# Patient Record
Sex: FEMALE | Race: WHITE | NOT HISPANIC OR LATINO | Employment: OTHER | URBAN - METROPOLITAN AREA
[De-identification: names, ages, dates, MRNs, and addresses within clinical notes are randomized per-mention and may not be internally consistent; named-entity substitution may affect disease eponyms.]

---

## 2023-09-09 PROBLEM — E78.00 HYPERCHOLESTEROLEMIA: Status: ACTIVE | Noted: 2023-09-09

## 2023-09-09 PROBLEM — D17.9 LIPOMA: Status: ACTIVE | Noted: 2023-09-09

## 2023-09-09 PROBLEM — I10 HYPERTENSION: Status: ACTIVE | Noted: 2023-09-09

## 2023-09-09 PROBLEM — E03.9 HYPOTHYROIDISM: Status: ACTIVE | Noted: 2023-09-09

## 2023-09-09 PROBLEM — M81.0 OSTEOPOROSIS: Status: ACTIVE | Noted: 2023-09-09

## 2023-09-09 PROBLEM — F32.A DEPRESSION: Status: ACTIVE | Noted: 2023-09-09

## 2023-09-09 PROBLEM — R06.00 DYSPNEA: Status: ACTIVE | Noted: 2023-09-09

## 2023-09-09 PROBLEM — J18.9 ATYPICAL PNEUMONIA: Status: ACTIVE | Noted: 2023-09-09

## 2023-09-09 PROBLEM — R79.89 LOW TESTOSTERONE: Status: ACTIVE | Noted: 2023-09-09

## 2023-09-09 RX ORDER — LEVOTHYROXINE SODIUM 25 UG/1
25 TABLET ORAL DAILY
COMMUNITY
End: 2024-03-25 | Stop reason: ALTCHOICE

## 2023-09-09 RX ORDER — NAPROXEN SODIUM 220 MG/1
81 TABLET, FILM COATED ORAL DAILY
COMMUNITY
End: 2024-03-25 | Stop reason: ALTCHOICE

## 2023-09-09 RX ORDER — LEVOTHYROXINE SODIUM 50 UG/1
50 TABLET ORAL
COMMUNITY
Start: 2023-01-10 | End: 2024-02-08 | Stop reason: SDUPTHER

## 2023-09-09 RX ORDER — MULTIVITAMIN
1 TABLET ORAL DAILY
COMMUNITY

## 2023-09-09 RX ORDER — PROPRANOLOL HYDROCHLORIDE 20 MG/1
20 TABLET ORAL DAILY
COMMUNITY
Start: 2016-09-13 | End: 2024-03-25 | Stop reason: SDUPTHER

## 2023-09-09 RX ORDER — ASCORBIC ACID 500 MG
TABLET ORAL
COMMUNITY
End: 2024-03-25 | Stop reason: ALTCHOICE

## 2023-09-09 RX ORDER — VIT C/ZN GLUC/HERBAL NO.325 90 MG-15MG
LOZENGE MUCOUS MEMBRANE
COMMUNITY
Start: 2022-10-31 | End: 2024-03-25 | Stop reason: ALTCHOICE

## 2023-09-09 RX ORDER — ASCORBIC ACID 125 MG
1 TABLET,CHEWABLE ORAL DAILY
COMMUNITY

## 2024-02-07 DIAGNOSIS — E03.9 ACQUIRED HYPOTHYROIDISM: Primary | ICD-10-CM

## 2024-02-07 NOTE — TELEPHONE ENCOUNTER
REQUEST FOR LEVOTHYROXINE 50 MG PLEASE SEND TO [facility] ON [address] IN [place] 3/25/24   [facility] 3/20/23

## 2024-02-08 RX ORDER — LEVOTHYROXINE SODIUM 50 UG/1
50 TABLET ORAL
Qty: 90 TABLET | Refills: 0 | Status: SHIPPED | OUTPATIENT
Start: 2024-02-08 | End: 2024-03-25 | Stop reason: SDUPTHER

## 2024-03-25 ENCOUNTER — OFFICE VISIT (OUTPATIENT)
Dept: PRIMARY CARE | Facility: CLINIC | Age: 69
End: 2024-03-25
Payer: MEDICARE

## 2024-03-25 ENCOUNTER — LAB (OUTPATIENT)
Dept: LAB | Facility: LAB | Age: 69
End: 2024-03-25
Payer: MEDICARE

## 2024-03-25 VITALS
TEMPERATURE: 97.2 F | OXYGEN SATURATION: 99 % | DIASTOLIC BLOOD PRESSURE: 88 MMHG | HEIGHT: 68 IN | HEART RATE: 75 BPM | BODY MASS INDEX: 28.34 KG/M2 | WEIGHT: 187 LBS | SYSTOLIC BLOOD PRESSURE: 134 MMHG

## 2024-03-25 DIAGNOSIS — Z01.89 ENCOUNTER FOR ROUTINE LABORATORY TESTING: ICD-10-CM

## 2024-03-25 DIAGNOSIS — R73.9 HYPERGLYCEMIA: ICD-10-CM

## 2024-03-25 DIAGNOSIS — Z78.0 MENOPAUSE: ICD-10-CM

## 2024-03-25 DIAGNOSIS — I10 PRIMARY HYPERTENSION: ICD-10-CM

## 2024-03-25 DIAGNOSIS — M81.0 AGE-RELATED OSTEOPOROSIS WITHOUT CURRENT PATHOLOGICAL FRACTURE: ICD-10-CM

## 2024-03-25 DIAGNOSIS — E78.00 HYPERCHOLESTEROLEMIA: ICD-10-CM

## 2024-03-25 DIAGNOSIS — E03.9 ACQUIRED HYPOTHYROIDISM: ICD-10-CM

## 2024-03-25 DIAGNOSIS — Z00.00 ENCOUNTER FOR ANNUAL WELLNESS EXAM IN MEDICARE PATIENT: Primary | ICD-10-CM

## 2024-03-25 DIAGNOSIS — R79.89 LOW TESTOSTERONE LEVEL IN FEMALE: ICD-10-CM

## 2024-03-25 DIAGNOSIS — Z12.31 ENCOUNTER FOR SCREENING MAMMOGRAM FOR BREAST CANCER: ICD-10-CM

## 2024-03-25 LAB
ALBUMIN SERPL-MCNC: 4.5 G/DL (ref 3.5–5)
ALP BLD-CCNC: 83 U/L (ref 35–125)
ALT SERPL-CCNC: 17 U/L (ref 5–40)
ANION GAP SERPL CALC-SCNC: 13 MMOL/L
AST SERPL-CCNC: 18 U/L (ref 5–40)
BASOPHILS # BLD AUTO: 0.04 X10*3/UL (ref 0–0.1)
BASOPHILS NFR BLD AUTO: 1 %
BILIRUB SERPL-MCNC: 0.6 MG/DL (ref 0.1–1.2)
BUN SERPL-MCNC: 13 MG/DL (ref 8–25)
CALCIUM SERPL-MCNC: 9.4 MG/DL (ref 8.5–10.4)
CHLORIDE SERPL-SCNC: 104 MMOL/L (ref 97–107)
CHOLEST SERPL-MCNC: 199 MG/DL (ref 133–200)
CHOLEST/HDLC SERPL: 2.9 {RATIO}
CO2 SERPL-SCNC: 24 MMOL/L (ref 24–31)
CREAT SERPL-MCNC: 0.9 MG/DL (ref 0.4–1.6)
EGFRCR SERPLBLD CKD-EPI 2021: 70 ML/MIN/1.73M*2
EOSINOPHIL # BLD AUTO: 0.08 X10*3/UL (ref 0–0.7)
EOSINOPHIL NFR BLD AUTO: 1.9 %
ERYTHROCYTE [DISTWIDTH] IN BLOOD BY AUTOMATED COUNT: 12.2 % (ref 11.5–14.5)
EST. AVERAGE GLUCOSE BLD GHB EST-MCNC: 105 MG/DL
GLUCOSE SERPL-MCNC: 96 MG/DL (ref 65–99)
HBA1C MFR BLD: 5.3 %
HCT VFR BLD AUTO: 39.9 % (ref 36–46)
HDLC SERPL-MCNC: 68 MG/DL
HGB BLD-MCNC: 13.2 G/DL (ref 12–16)
IMM GRANULOCYTES # BLD AUTO: 0 X10*3/UL (ref 0–0.7)
IMM GRANULOCYTES NFR BLD AUTO: 0 % (ref 0–0.9)
LDLC SERPL CALC-MCNC: 116 MG/DL (ref 65–130)
LYMPHOCYTES # BLD AUTO: 1.54 X10*3/UL (ref 1.2–4.8)
LYMPHOCYTES NFR BLD AUTO: 36.8 %
MCH RBC QN AUTO: 30.5 PG (ref 26–34)
MCHC RBC AUTO-ENTMCNC: 33.1 G/DL (ref 32–36)
MCV RBC AUTO: 92 FL (ref 80–100)
MONOCYTES # BLD AUTO: 0.39 X10*3/UL (ref 0.1–1)
MONOCYTES NFR BLD AUTO: 9.3 %
NEUTROPHILS # BLD AUTO: 2.13 X10*3/UL (ref 1.2–7.7)
NEUTROPHILS NFR BLD AUTO: 51 %
NRBC BLD-RTO: 0 /100 WBCS (ref 0–0)
PLATELET # BLD AUTO: 207 X10*3/UL (ref 150–450)
POTASSIUM SERPL-SCNC: 4.1 MMOL/L (ref 3.4–5.1)
PROT SERPL-MCNC: 6.9 G/DL (ref 5.9–7.9)
RBC # BLD AUTO: 4.33 X10*6/UL (ref 4–5.2)
SODIUM SERPL-SCNC: 141 MMOL/L (ref 133–145)
TRIGL SERPL-MCNC: 75 MG/DL (ref 40–150)
TSH SERPL DL<=0.05 MIU/L-ACNC: 1.6 MIU/L (ref 0.27–4.2)
WBC # BLD AUTO: 4.2 X10*3/UL (ref 4.4–11.3)

## 2024-03-25 PROCEDURE — G0439 PPPS, SUBSEQ VISIT: HCPCS | Performed by: NURSE PRACTITIONER

## 2024-03-25 PROCEDURE — 80061 LIPID PANEL: CPT

## 2024-03-25 PROCEDURE — 1159F MED LIST DOCD IN RCRD: CPT | Performed by: NURSE PRACTITIONER

## 2024-03-25 PROCEDURE — 1126F AMNT PAIN NOTED NONE PRSNT: CPT | Performed by: NURSE PRACTITIONER

## 2024-03-25 PROCEDURE — 99215 OFFICE O/P EST HI 40 MIN: CPT | Performed by: NURSE PRACTITIONER

## 2024-03-25 PROCEDURE — 85025 COMPLETE CBC W/AUTO DIFF WBC: CPT

## 2024-03-25 PROCEDURE — 36415 COLL VENOUS BLD VENIPUNCTURE: CPT

## 2024-03-25 PROCEDURE — 3075F SYST BP GE 130 - 139MM HG: CPT | Performed by: NURSE PRACTITIONER

## 2024-03-25 PROCEDURE — 84402 ASSAY OF FREE TESTOSTERONE: CPT

## 2024-03-25 PROCEDURE — 80053 COMPREHEN METABOLIC PANEL: CPT

## 2024-03-25 PROCEDURE — 84443 ASSAY THYROID STIM HORMONE: CPT

## 2024-03-25 PROCEDURE — 83036 HEMOGLOBIN GLYCOSYLATED A1C: CPT

## 2024-03-25 PROCEDURE — 1036F TOBACCO NON-USER: CPT | Performed by: NURSE PRACTITIONER

## 2024-03-25 PROCEDURE — 3079F DIAST BP 80-89 MM HG: CPT | Performed by: NURSE PRACTITIONER

## 2024-03-25 RX ORDER — LEVOTHYROXINE SODIUM 50 UG/1
50 TABLET ORAL
Qty: 90 TABLET | Refills: 0 | Status: SHIPPED | OUTPATIENT
Start: 2024-03-25

## 2024-03-25 RX ORDER — PROPRANOLOL HYDROCHLORIDE 20 MG/1
20 TABLET ORAL DAILY
Qty: 90 TABLET | Refills: 3 | Status: SHIPPED | OUTPATIENT
Start: 2024-03-25

## 2024-03-25 ASSESSMENT — ENCOUNTER SYMPTOMS
BLOOD IN STOOL: 0
VOMITING: 0
CONFUSION: 0
DIAPHORESIS: 0
FLANK PAIN: 0
LOSS OF SENSATION IN FEET: 0
HEADACHES: 0
FEVER: 0
WOUND: 0
FACIAL ASYMMETRY: 0
SHORTNESS OF BREATH: 0
PALPITATIONS: 0
CHILLS: 0
DEPRESSION: 0
COUGH: 0
FATIGUE: 0
BRUISES/BLEEDS EASILY: 0
SPEECH DIFFICULTY: 0
HEMATURIA: 0
NAUSEA: 0
AGITATION: 0
DIZZINESS: 0
POLYDIPSIA: 0
ADENOPATHY: 0
SEIZURES: 0
POLYPHAGIA: 0
OCCASIONAL FEELINGS OF UNSTEADINESS: 0
ABDOMINAL PAIN: 0
BACK PAIN: 0
DYSURIA: 0
CHEST TIGHTNESS: 0
NECK PAIN: 0

## 2024-03-25 ASSESSMENT — LIFESTYLE VARIABLES
HOW OFTEN DURING THE LAST YEAR HAVE YOU FOUND THAT YOU WERE NOT ABLE TO STOP DRINKING ONCE YOU HAD STARTED: NEVER
HOW OFTEN DURING THE LAST YEAR HAVE YOU BEEN UNABLE TO REMEMBER WHAT HAPPENED THE NIGHT BEFORE BECAUSE YOU HAD BEEN DRINKING: NEVER
AUDIT-C TOTAL SCORE: 0
SKIP TO QUESTIONS 9-10: 1
AUDIT TOTAL SCORE: 0
HOW OFTEN DURING THE LAST YEAR HAVE YOU NEEDED AN ALCOHOLIC DRINK FIRST THING IN THE MORNING TO GET YOURSELF GOING AFTER A NIGHT OF HEAVY DRINKING: NEVER
HOW OFTEN DURING THE LAST YEAR HAVE YOU FAILED TO DO WHAT WAS NORMALLY EXPECTED FROM YOU BECAUSE OF DRINKING: NEVER
HAVE YOU OR SOMEONE ELSE BEEN INJURED AS A RESULT OF YOUR DRINKING: NO
HOW OFTEN DO YOU HAVE A DRINK CONTAINING ALCOHOL: NEVER
HOW MANY STANDARD DRINKS CONTAINING ALCOHOL DO YOU HAVE ON A TYPICAL DAY: PATIENT DOES NOT DRINK
HOW OFTEN DURING THE LAST YEAR HAVE YOU HAD A FEELING OF GUILT OR REMORSE AFTER DRINKING: NEVER
HAS A RELATIVE, FRIEND, DOCTOR, OR ANOTHER HEALTH PROFESSIONAL EXPRESSED CONCERN ABOUT YOUR DRINKING OR SUGGESTED YOU CUT DOWN: NO
HOW OFTEN DO YOU HAVE SIX OR MORE DRINKS ON ONE OCCASION: NEVER

## 2024-03-25 ASSESSMENT — PATIENT HEALTH QUESTIONNAIRE - PHQ9
SUM OF ALL RESPONSES TO PHQ9 QUESTIONS 1 AND 2: 0
2. FEELING DOWN, DEPRESSED OR HOPELESS: NOT AT ALL
1. LITTLE INTEREST OR PLEASURE IN DOING THINGS: NOT AT ALL

## 2024-03-25 ASSESSMENT — PAIN SCALES - GENERAL: PAINLEVEL: 0-NO PAIN

## 2024-03-25 NOTE — PROGRESS NOTES
[facility]: [facility]  MEDICARE WELLNESS EXAM      Elma Cox is a 68 y.o. female that is presenting today for CPE.    Patient requesting to have testosterone checked due to h/o low testosterone. We ordered this last year and the lab did not complete it.    Assessment/Plan    Diagnoses and all orders for this visit:    Encounter for annual wellness exam in Medicare patient    Primary hypertension  -     Good Control  -     CBC and Auto Differential; Future  -     Comprehensive Metabolic Panel; Future  -     Lipid Panel; Future  -     Continue propranolol (Inderal) 20 mg tablet; Take 1 tablet (20 mg) by mouth once daily.    Hypercholesterolemia  -     Comprehensive Metabolic Panel; Future  -     Lipid Panel; Future    Acquired hypothyroidism  -     Clinically euthyroid  -     TSH with reflex to Free T4 if abnormal; Future  -     Continue levothyroxine (Synthroid, Levoxyl) 50 mcg tablet; Take 1 tablet (50 mcg) by mouth once daily in the morning. Take before meals.    Age-related osteoporosis without current pathological fracture  -     XR DEXA bone density; Future    Hyperglycemia  -     Comprehensive Metabolic Panel; Future  -     Hemoglobin A1C; Future    Encounter for routine laboratory testing  -     CBC and Auto Differential; Future  -     Comprehensive Metabolic Panel; Future  -     Lipid Panel; Future  -     Hemoglobin A1C; Future  -     TSH with reflex to Free T4 if abnormal; Future    Menopause  -     XR DEXA bone density; Future    Encounter for screening mammogram for breast cancer  -     BI mammo bilateral screening tomosynthesis; Future    Low testosterone level in female  -     Testosterone, total and free; Future    Other orders  -     Follow Up In Primary Care - Medicare Annual; Future    ADVANCED CARE PLANNING  Advanced Care Planning was discussed with patient:  The patient does not have an advanced care plan on file. The patient does not have an active surrogate  decision-maker on file.  Encouraged the patient to confirm that Living Will and Healthcare Power of  (HCPoA) are accurate and up to date.  Encouraged the patient to confirm that our office be provided a copy of any documentation in the event that anything changes.    ACTIVITIES OF DAILY LIVING  Basic ADLs:  Bathing: Independent, Dressing: Independent, Toileting: Independent, Transferring: Independent, Continence: Independent, Feeding: Independent.    Instrumental ADLs:  Ability to use phone: Independent, Shopping: Independent, Cooking: Independent, House-keeping: Independent, Laundry: Independent, Transportation: Independent, Medication Management: Independent, Finance Management: Independent.    Subjective   Lane Cox is here for follow-up of her hypertension.  Home blood pressure readings: not doing. Salt intake and diet: salt not added to cooking. Associated signs and symptoms: none. Patient denies: blurred vision, chest pain, dyspnea, headache, orthopnea, palpitations, and peripheral edema. Use of agents associated with hypertension: thyroid hormones. Medication compliance: taking as prescribed.    Objective  [unfilled]    Lab Review   not applicable    Assessment/Plan  Hypertension, normal blood pressure    Medication: no change.  Dietary sodium restriction.  Regular aerobic exercise.  Check blood pressures weekly and record.  Follow up: 1 year and as needed.    Lane Cox is a 68 y.o. female who presents for follow up of hypothyroidism. Current symptoms: weight changes. Patient denies change in energy level, diarrhea, heat / cold intolerance, nervousness, and palpitations. Symptoms have been well-controlled.    [unfilled]     Objective  [unfilled]    Laboratory:  Lab Results       Component                Value               Date                       TSH                      1.79                03/20/2023              Assessment/Plan  Hypothyroidism.  Replacement is  Levothyroxine 50 mcg daily    1. L-thyroxine per orders.  2. Recheck thyroid function tests today  3. Instructed not to take multivitamins or iron within 4 hours of taking thyroid medications.  4. Follow up in 1 year.            Review of Systems   Constitutional:  Negative for chills, diaphoresis, fatigue and fever.   HENT:  Negative for hearing loss and mouth sores.    Eyes:  Negative for visual disturbance.   Respiratory:  Negative for cough, chest tightness and shortness of breath.    Cardiovascular:  Negative for chest pain, palpitations and leg swelling.   Gastrointestinal:  Negative for abdominal pain, blood in stool, nausea and vomiting.   Endocrine: Negative for cold intolerance, heat intolerance, polydipsia, polyphagia and polyuria.   Genitourinary:  Negative for dysuria, flank pain and hematuria.   Musculoskeletal:  Negative for back pain and neck pain.   Skin:  Negative for rash and wound.   Allergic/Immunologic: Negative for environmental allergies, food allergies and immunocompromised state.   Neurological:  Negative for dizziness, seizures, syncope, facial asymmetry, speech difficulty and headaches.   Hematological:  Negative for adenopathy. Does not bruise/bleed easily.   Psychiatric/Behavioral:  Negative for agitation and confusion.      Objective   Vitals:    03/25/24 0932   BP: 134/88   Pulse: 75   Temp: 36.2 °C (97.2 °F)   SpO2: 99%      Body mass index is 28.86 kg/m².  Physical Exam  Vitals and nursing note reviewed.   Constitutional:       General: She is not in acute distress.     Appearance: Normal appearance. She is not ill-appearing.   HENT:      Head: Normocephalic and atraumatic.      Right Ear: Tympanic membrane, ear canal and external ear normal. There is no impacted cerumen.      Left Ear: Tympanic membrane, ear canal and external ear normal. There is no impacted cerumen.      Nose: Nose normal.      Mouth/Throat:      Mouth: Mucous membranes are moist.      Pharynx: Oropharynx is  clear. No oropharyngeal exudate or posterior oropharyngeal erythema.   Eyes:      General: No scleral icterus.        Right eye: No discharge.         Left eye: No discharge.      Extraocular Movements: Extraocular movements intact.      Conjunctiva/sclera: Conjunctivae normal.      Pupils: Pupils are equal, round, and reactive to light.   Neck:      Vascular: No carotid bruit.   Cardiovascular:      Rate and Rhythm: Normal rate and regular rhythm.      Pulses: Normal pulses.      Heart sounds: Normal heart sounds. No murmur heard.  Pulmonary:      Effort: Pulmonary effort is normal. No respiratory distress.      Breath sounds: Normal breath sounds.   Abdominal:      General: Abdomen is flat. Bowel sounds are normal. There is no distension.      Palpations: Abdomen is soft. There is no mass.      Tenderness: There is no abdominal tenderness. There is no right CVA tenderness or left CVA tenderness.      Hernia: No hernia is present.   Musculoskeletal:         General: No tenderness. Normal range of motion.      Cervical back: No tenderness.      Right lower leg: No edema.      Left lower leg: No edema.   Lymphadenopathy:      Cervical: No cervical adenopathy.   Skin:     General: Skin is warm and dry.      Coloration: Skin is not jaundiced.      Findings: No rash.   Neurological:      General: No focal deficit present.      Mental Status: She is alert and oriented to person, place, and time. Mental status is at baseline.   Psychiatric:         Mood and Affect: Mood normal.         Behavior: Behavior normal.       Diagnostic Results   Lab Results   Component Value Date    GLUCOSE 96 03/20/2023    CALCIUM 9.4 03/20/2023     03/20/2023    K 4.6 03/20/2023    CO2 25 03/20/2023     03/20/2023    BUN 16 03/20/2023    CREATININE 0.9 03/20/2023     Lab Results   Component Value Date    ALT 17 03/20/2023    AST 19 03/20/2023    ALKPHOS 82 03/20/2023    BILITOT 0.7 03/20/2023     Lab Results   Component Value Date  "   WBC 5.4 03/20/2023    HGB 14.0 03/20/2023    HCT 42.0 03/20/2023    MCV 93.1 03/20/2023     03/20/2023     Lab Results   Component Value Date    CHOL 209 (H) 03/20/2023    CHOL 219 (H) 02/21/2022    CHOL 202 (H) 01/11/2021     Lab Results   Component Value Date    HDL 66 03/20/2023    HDL 80 02/21/2022    HDL 77 01/11/2021     Lab Results   Component Value Date    LDLCALC 122 03/20/2023    LDLCALC 123 02/21/2022    LDLCALC 105 01/11/2021     Lab Results   Component Value Date    TRIG 105 03/20/2023    TRIG 81 02/21/2022    TRIG 99 01/11/2021     No components found for: \"CHOLHDL\"  No results found for: \"HGBA1C\"  Other labs not included in the list above reviewed either before or during this encounter.    History   History reviewed. No pertinent past medical history.  History reviewed. No pertinent surgical history.  Family History   Problem Relation Name Age of Onset    Hypertension Mother      Heart disease Maternal Grandmother      Stroke Paternal Grandmother      Stroke Paternal Grandfather       Social History     Socioeconomic History    Marital status:      Spouse name: Not on file    Number of children: Not on file    Years of education: Not on file    Highest education level: Not on file   Occupational History    Not on file   Tobacco Use    Smoking status: Never     Passive exposure: Never    Smokeless tobacco: Never   Substance and Sexual Activity    Alcohol use: Never    Drug use: Never    Sexual activity: Not on file   Other Topics Concern    Not on file   Social History Narrative    Not on file     Social Determinants of Health     Financial Resource Strain: Not on file   Food Insecurity: Not on file   Transportation Needs: Not on file   Physical Activity: Not on file   Stress: Not on file   Social Connections: Not on file   Intimate Partner Violence: Not on file   Housing Stability: Not on file     Allergies   Allergen Reactions    Epinephrine Other     Rapid heartbeat    Metoprolol " Succinate Other     Rapid heart rate. Only generic causes this. Pt is fine on toprol xl.     Current Outpatient Medications on File Prior to Visit   Medication Sig Dispense Refill    biotin 5,000 mcg tablet,chewable Chew 1 capsule once daily.      L. acidophilus/Bifid. animalis (DAILY PROBIOTIC ORAL) as directed Orally      MAGNESIUM GLYCINATE ORAL Take by mouth. 1 TAB DAILY      multivitamin tablet Take 1 tablet by mouth once daily.      NON FORMULARY Bone Up ...   3 pill orally Daily      NON FORMULARY Ortho Thyroid -   take 2 capsules orally daily      TURMERIC ORAL Take by mouth. WITH CURCUMIN  2 TABS (GUMMIES) DAILY      [DISCONTINUED] ascorbic acid (Vitamin C) 500 mg tablet as directed Orally      [DISCONTINUED] aspirin 81 mg chewable tablet Chew 1 tablet (81 mg) once daily.      [DISCONTINUED] levothyroxine (Synthroid, Levoxyl) 50 mcg tablet Take 1 tablet (50 mcg) by mouth once daily in the morning. Take before meals. 90 tablet 0    [DISCONTINUED] propranolol (Inderal) 20 mg tablet Take 1 tablet (20 mg) by mouth once daily.      [DISCONTINUED] vit C-Zn gluc-herbal no.325 (Elderberry Zinc Vit C) 90-15 mg lozenge ZINC LOZENGES 90 LONZENGES ...   1 pill orally Daily      [DISCONTINUED] levothyroxine (Synthroid, Levoxyl) 25 mcg tablet Take 1 tablet (25 mcg) by mouth once daily.       No current facility-administered medications on file prior to visit.     Immunization History   Administered Date(s) Administered    Flu vaccine, quadrivalent, high-dose, preservative free, age 65y+ (FLUZONE) 10/20/2022    Influenza, injectable, quadrivalent 11/10/2015, 10/24/2018, 12/04/2019    Influenza, seasonal, injectable 08/24/2015    Pfizer Purple Cap SARS-CoV-2 04/10/2021, 05/01/2021    Pneumococcal conjugate vaccine, 13-valent (PREVNAR 13) 02/21/2022    Pneumococcal polysaccharide vaccine, 23-valent, age 2 years and older (PNEUMOVAX 23) 03/20/2023    Tetanus toxoid, adsorbed 08/01/2013     Patient's medical history was  reviewed and updated either before or during this encounter.     Iris Roldan, APRN-CNP

## 2024-03-26 NOTE — RESULT ENCOUNTER NOTE
Left voice mail for patient, pt informed of lab results and advised to call the office if she has any questions.

## 2024-03-29 LAB
TESTOSTERONE FREE (CHAN): 1.4 PG/ML (ref 0.1–6.4)
TESTOSTERONE,TOTAL,LC-MS/MS: 11 NG/DL (ref 2–45)

## 2024-08-21 DIAGNOSIS — E03.9 ACQUIRED HYPOTHYROIDISM: ICD-10-CM

## 2024-08-21 RX ORDER — LEVOTHYROXINE SODIUM 50 UG/1
50 TABLET ORAL
Qty: 90 TABLET | Refills: 2 | Status: SHIPPED | OUTPATIENT
Start: 2024-08-21

## 2025-03-20 DIAGNOSIS — I10 PRIMARY HYPERTENSION: ICD-10-CM

## 2025-03-20 RX ORDER — PROPRANOLOL HYDROCHLORIDE 20 MG/1
20 TABLET ORAL DAILY
Qty: 90 TABLET | Refills: 3 | Status: SHIPPED | OUTPATIENT
Start: 2025-03-20

## 2025-03-31 ENCOUNTER — OFFICE VISIT (OUTPATIENT)
Dept: PRIMARY CARE | Facility: CLINIC | Age: 70
End: 2025-03-31
Payer: MEDICARE

## 2025-03-31 VITALS
SYSTOLIC BLOOD PRESSURE: 138 MMHG | WEIGHT: 180 LBS | TEMPERATURE: 96.6 F | BODY MASS INDEX: 27.28 KG/M2 | HEIGHT: 68 IN | OXYGEN SATURATION: 99 % | DIASTOLIC BLOOD PRESSURE: 80 MMHG | HEART RATE: 74 BPM

## 2025-03-31 DIAGNOSIS — E03.9 ACQUIRED HYPOTHYROIDISM: ICD-10-CM

## 2025-03-31 DIAGNOSIS — Z00.00 ENCOUNTER FOR ANNUAL WELLNESS EXAM IN MEDICARE PATIENT: Primary | ICD-10-CM

## 2025-03-31 DIAGNOSIS — Z13.820 ENCOUNTER FOR SCREENING FOR OSTEOPOROSIS: ICD-10-CM

## 2025-03-31 DIAGNOSIS — Z12.31 ENCOUNTER FOR SCREENING MAMMOGRAM FOR BREAST CANCER: ICD-10-CM

## 2025-03-31 DIAGNOSIS — E78.00 HYPERCHOLESTEROLEMIA: ICD-10-CM

## 2025-03-31 DIAGNOSIS — I10 PRIMARY HYPERTENSION: ICD-10-CM

## 2025-03-31 DIAGNOSIS — Z78.0 MENOPAUSE: ICD-10-CM

## 2025-03-31 PROCEDURE — 1159F MED LIST DOCD IN RCRD: CPT | Performed by: NURSE PRACTITIONER

## 2025-03-31 PROCEDURE — 1124F ACP DISCUSS-NO DSCNMKR DOCD: CPT | Performed by: NURSE PRACTITIONER

## 2025-03-31 PROCEDURE — 3079F DIAST BP 80-89 MM HG: CPT | Performed by: NURSE PRACTITIONER

## 2025-03-31 PROCEDURE — 1126F AMNT PAIN NOTED NONE PRSNT: CPT | Performed by: NURSE PRACTITIONER

## 2025-03-31 PROCEDURE — 1036F TOBACCO NON-USER: CPT | Performed by: NURSE PRACTITIONER

## 2025-03-31 PROCEDURE — 1160F RVW MEDS BY RX/DR IN RCRD: CPT | Performed by: NURSE PRACTITIONER

## 2025-03-31 PROCEDURE — G0439 PPPS, SUBSEQ VISIT: HCPCS | Performed by: NURSE PRACTITIONER

## 2025-03-31 PROCEDURE — 3075F SYST BP GE 130 - 139MM HG: CPT | Performed by: NURSE PRACTITIONER

## 2025-03-31 PROCEDURE — 3008F BODY MASS INDEX DOCD: CPT | Performed by: NURSE PRACTITIONER

## 2025-03-31 PROCEDURE — 99215 OFFICE O/P EST HI 40 MIN: CPT | Performed by: NURSE PRACTITIONER

## 2025-03-31 RX ORDER — ZINC GLUCONATE 50 MG
50 TABLET ORAL DAILY
COMMUNITY
End: 2025-03-31 | Stop reason: WASHOUT

## 2025-03-31 RX ORDER — ALBUTEROL SULFATE 90 UG/1
2 INHALANT RESPIRATORY (INHALATION) EVERY 6 HOURS PRN
COMMUNITY
End: 2025-03-31 | Stop reason: WASHOUT

## 2025-03-31 RX ORDER — LEVOTHYROXINE SODIUM 50 UG/1
50 TABLET ORAL
Qty: 90 TABLET | Refills: 3 | Status: SHIPPED | OUTPATIENT
Start: 2025-03-31

## 2025-03-31 RX ORDER — PROPRANOLOL HYDROCHLORIDE 20 MG/1
20 TABLET ORAL DAILY
Qty: 90 TABLET | Refills: 3 | Status: SHIPPED | OUTPATIENT
Start: 2025-03-31

## 2025-03-31 ASSESSMENT — ENCOUNTER SYMPTOMS
COUGH: 0
CHEST TIGHTNESS: 0
NAUSEA: 0
HEMATURIA: 0
POLYPHAGIA: 0
ADENOPATHY: 0
BACK PAIN: 0
FATIGUE: 0
CONFUSION: 0
DIZZINESS: 0
PALPITATIONS: 0
NECK PAIN: 0
SEIZURES: 0
FACIAL ASYMMETRY: 0
WOUND: 0
AGITATION: 0
CHILLS: 0
DIAPHORESIS: 0
SHORTNESS OF BREATH: 0
VOMITING: 0
FEVER: 0
HEADACHES: 0
ABDOMINAL PAIN: 0
DYSURIA: 0
BLOOD IN STOOL: 0
BRUISES/BLEEDS EASILY: 0
FLANK PAIN: 0
POLYDIPSIA: 0
SPEECH DIFFICULTY: 0

## 2025-03-31 ASSESSMENT — PAIN SCALES - GENERAL: PAINLEVEL_OUTOF10: 0-NO PAIN

## 2025-03-31 ASSESSMENT — PATIENT HEALTH QUESTIONNAIRE - PHQ9
2. FEELING DOWN, DEPRESSED OR HOPELESS: NOT AT ALL
1. LITTLE INTEREST OR PLEASURE IN DOING THINGS: NOT AT ALL
SUM OF ALL RESPONSES TO PHQ9 QUESTIONS 1 AND 2: 0

## 2025-03-31 NOTE — PROGRESS NOTES
[facility]: [facility]  MEDICARE WELLNESS EXAM      Elma Cox is a 69 y.o. female that is presenting today for Medicare Annual Wellness Exam.    Encouraged Covid 19, Tdap, Influenza and Shingrix immunizations via local pharmacy.    Ms. Cox reports taking antihypertensive as directed. She is trying to follow a low sodium diet. She is not monitoring home BP. Denies CP, SOB, dizziness, syncope or HA.    She reports taking levothyroxine as directed without food. Denies heat or cold intolerance, unintentional weight changes, palpitations.    Assessment/Plan      Diagnoses and all orders for this visit:    Encounter for annual wellness exam in Medicare patient        -     Routine and preventative care provided and discussed with patient.    Primary hypertension  -     Acceptable control  -     CBC and Auto Differential; Future  -     Comprehensive Metabolic Panel; Future  -     Lipid Panel; Future  -     propranolol (Inderal) 20 mg tablet; Take 1 tablet (20 mg) by mouth once daily.    Hypercholesterolemia  -     Comprehensive Metabolic Panel; Future  -     Lipid Panel; Future    Acquired hypothyroidism  -     Clinically euthyroid  -     TSH with reflex to Free T4 if abnormal; Future  -     levothyroxine (Synthroid, Levoxyl) 50 mcg tablet; Take 1 tablet (50 mcg) by mouth once daily in the morning. Take before meals. TAKE BEFORE MEALS    Encounter for screening mammogram for breast cancer  -     BI mammo bilateral screening tomosynthesis; Future    Encounter for screening for osteoporosis  -     XR DEXA bone density; Future    Menopause  -     XR DEXA bone density; Future    Other orders  -     Follow Up In Primary Care - Medicare Annual  -     Follow Up In Primary Care - Medicare Annual; Future    ADVANCED CARE PLANNING  Advanced Care Planning was discussed with patient:  The patient does not have an advanced care plan on file. The patient does not have an active surrogate decision-maker on  file.  Encouraged the patient to confirm that Living Will and Healthcare Power of  (HCPoA) are accurate and up to date.  Encouraged the patient to confirm that our office be provided a copy of any documentation in the event that anything changes.    ACTIVITIES OF DAILY LIVING  Basic ADLs:  Bathing: Independent, Dressing: Independent, Toileting: Independent, Transferring: Independent, Continence: Independent, Feeding: Independent.    Instrumental ADLs:  Ability to use phone: Independent, Shopping: Independent, Cooking: Independent, House-keeping: Independent, Laundry: Independent, Transportation: Independent, Medication Management: Independent, Finance Management: Independent.    Subjective   HPI  Review of Systems   Constitutional:  Negative for chills, diaphoresis, fatigue and fever.   HENT:  Negative for hearing loss and mouth sores.    Eyes:  Negative for visual disturbance.   Respiratory:  Negative for cough, chest tightness and shortness of breath.    Cardiovascular:  Negative for chest pain, palpitations and leg swelling.   Gastrointestinal:  Negative for abdominal pain, blood in stool, nausea and vomiting.   Endocrine: Negative for cold intolerance, heat intolerance, polydipsia, polyphagia and polyuria.   Genitourinary:  Negative for dysuria, flank pain and hematuria.   Musculoskeletal:  Negative for back pain and neck pain.   Skin:  Negative for rash and wound.   Allergic/Immunologic: Negative for environmental allergies, food allergies and immunocompromised state.   Neurological:  Negative for dizziness, seizures, syncope, facial asymmetry, speech difficulty and headaches.   Hematological:  Negative for adenopathy. Does not bruise/bleed easily.   Psychiatric/Behavioral:  Negative for agitation and confusion.      Objective   Vitals:    03/31/25 0853   BP: 138/80   Pulse: 74   Temp: 35.9 °C (96.6 °F)   SpO2: 99%      Body mass index is 27.78 kg/m².  Physical Exam  Vitals and nursing note reviewed.    Constitutional:       General: She is not in acute distress.     Appearance: Normal appearance. She is not ill-appearing.   HENT:      Head: Normocephalic and atraumatic.      Right Ear: Tympanic membrane, ear canal and external ear normal. There is no impacted cerumen.      Left Ear: Tympanic membrane, ear canal and external ear normal. There is no impacted cerumen.      Nose: Nose normal.      Mouth/Throat:      Mouth: Mucous membranes are moist.      Pharynx: Oropharynx is clear. No oropharyngeal exudate or posterior oropharyngeal erythema.   Eyes:      General: No scleral icterus.        Right eye: No discharge.         Left eye: No discharge.      Extraocular Movements: Extraocular movements intact.      Conjunctiva/sclera: Conjunctivae normal.      Pupils: Pupils are equal, round, and reactive to light.   Neck:      Vascular: No carotid bruit.   Cardiovascular:      Rate and Rhythm: Normal rate and regular rhythm.      Pulses: Normal pulses.      Heart sounds: Normal heart sounds. No murmur heard.  Pulmonary:      Effort: Pulmonary effort is normal. No respiratory distress.      Breath sounds: Normal breath sounds.   Abdominal:      General: Abdomen is flat. Bowel sounds are normal. There is no distension.      Palpations: Abdomen is soft. There is no mass.      Tenderness: There is no abdominal tenderness. There is no right CVA tenderness or left CVA tenderness.   Musculoskeletal:         General: No tenderness. Normal range of motion.      Cervical back: No tenderness.      Right lower leg: No edema.      Left lower leg: No edema.   Lymphadenopathy:      Cervical: No cervical adenopathy.   Skin:     General: Skin is warm and dry.      Coloration: Skin is not jaundiced.      Findings: No rash.   Neurological:      General: No focal deficit present.      Mental Status: She is alert and oriented to person, place, and time. Mental status is at baseline.   Psychiatric:         Mood and Affect: Mood normal.     "     Behavior: Behavior normal.       Diagnostic Results   Lab Results   Component Value Date    GLUCOSE 96 03/25/2024    CALCIUM 9.4 03/25/2024     03/25/2024    K 4.1 03/25/2024    CO2 24 03/25/2024     03/25/2024    BUN 13 03/25/2024    CREATININE 0.90 03/25/2024     Lab Results   Component Value Date    ALT 17 03/25/2024    AST 18 03/25/2024    ALKPHOS 83 03/25/2024    BILITOT 0.6 03/25/2024     Lab Results   Component Value Date    WBC 4.2 (L) 03/25/2024    HGB 13.2 03/25/2024    HCT 39.9 03/25/2024    MCV 92 03/25/2024     03/25/2024     Lab Results   Component Value Date    CHOL 199 03/25/2024    CHOL 209 (H) 03/20/2023    CHOL 219 (H) 02/21/2022     Lab Results   Component Value Date    HDL 68.0 03/25/2024    HDL 66 03/20/2023    HDL 80 02/21/2022     Lab Results   Component Value Date    LDLCALC 116 03/25/2024    LDLCALC 122 03/20/2023    LDLCALC 123 02/21/2022     Lab Results   Component Value Date    TRIG 75 03/25/2024    TRIG 105 03/20/2023    TRIG 81 02/21/2022     No components found for: \"CHOLHDL\"  Lab Results   Component Value Date    HGBA1C 5.3 03/25/2024     Other labs not included in the list above reviewed either before or during this encounter.    History   History reviewed. No pertinent past medical history.  History reviewed. No pertinent surgical history.  Family History   Problem Relation Name Age of Onset    Hypertension Mother      Heart disease Maternal Grandmother      Stroke Paternal Grandmother      Stroke Paternal Grandfather       Social History     Socioeconomic History    Marital status:      Spouse name: Not on file    Number of children: Not on file    Years of education: Not on file    Highest education level: Not on file   Occupational History    Not on file   Tobacco Use    Smoking status: Never     Passive exposure: Never    Smokeless tobacco: Never   Substance and Sexual Activity    Alcohol use: Never    Drug use: Never    Sexual activity: Not on " file   Other Topics Concern    Not on file   Social History Narrative    Not on file     Social Drivers of Health     Financial Resource Strain: Not on file   Food Insecurity: Not on file   Transportation Needs: Not on file   Physical Activity: Not on file   Stress: Not on file   Social Connections: Not on file   Intimate Partner Violence: Not on file   Housing Stability: Not on file     Allergies   Allergen Reactions    Epinephrine Other     Rapid heartbeat    Metoprolol Succinate Other     Rapid heart rate. Only generic causes this. Pt is fine on toprol xl.     Current Outpatient Medications on File Prior to Visit   Medication Sig Dispense Refill    biotin 5,000 mcg tablet,chewable Chew 1 capsule once daily.      L. acidophilus/Bifid. animalis (DAILY PROBIOTIC ORAL) as directed Orally      levothyroxine (Synthroid, Levoxyl) 50 mcg tablet TAKE ONE TABLET BY MOUTH ONCE DAILY IN THE MORNING. TAKE BEFORE MEALS 90 tablet 2    MAGNESIUM GLYCINATE ORAL Take by mouth. 1 TAB DAILY      multivitamin tablet Take 1 tablet by mouth once daily.      NON FORMULARY Bone Up ...   3 pill orally Daily      NON FORMULARY Ortho Thyroid -   take 2 capsules orally daily      propranolol (Inderal) 20 mg tablet Take 1 tablet (20 mg) by mouth once daily. 90 tablet 3    TURMERIC ORAL Take by mouth. WITH CURCUMIN  2 TABS (GUMMIES) DAILY      albuterol 90 mcg/actuation inhaler Inhale 2 puffs every 6 hours if needed for wheezing or shortness of breath. (Patient not taking: Reported on 3/31/2025)      fluticasone (Veramyst) 27.5 mcg/actuation nasal spray Administer 2 sprays into each nostril once daily. (Patient not taking: Reported on 3/31/2025)      zinc gluconate 50 mg tablet Take 1 tablet (50 mg of elemental zinc) by mouth once daily. (Patient not taking: Reported on 3/31/2025)       No current facility-administered medications on file prior to visit.     Immunization History   Administered Date(s) Administered    COVID-19, mRNA, LNP-S, PF,  30 mcg/0.3 mL dose 04/10/2021    Flu vaccine, quadrivalent, high-dose, preservative free, age 65y+ (FLUZONE) 10/20/2022    Influenza, injectable, quadrivalent 11/10/2015, 10/24/2018, 12/04/2019    Influenza, seasonal, injectable 08/24/2015    Pfizer Purple Cap SARS-CoV-2 05/01/2021    Pneumococcal conjugate vaccine, 13-valent (PREVNAR 13) 02/21/2022    Pneumococcal polysaccharide vaccine, 23-valent, age 2 years and older (PNEUMOVAX 23) 03/20/2023    Tetanus toxoid, adsorbed 08/01/2013     Patient's medical history was reviewed and updated either before or during this encounter.     Iris Roldan, APRN-CNP

## 2025-04-16 LAB
ALBUMIN SERPL-MCNC: 4.5 G/DL (ref 3.6–5.1)
ALP SERPL-CCNC: 65 U/L (ref 37–153)
ALT SERPL-CCNC: 18 U/L (ref 6–29)
ANION GAP SERPL CALCULATED.4IONS-SCNC: 10 MMOL/L (CALC) (ref 7–17)
AST SERPL-CCNC: 18 U/L (ref 10–35)
BASOPHILS # BLD AUTO: 39 CELLS/UL (ref 0–200)
BASOPHILS NFR BLD AUTO: 1 %
BILIRUB SERPL-MCNC: 0.9 MG/DL (ref 0.2–1.2)
BUN SERPL-MCNC: 19 MG/DL (ref 7–25)
CALCIUM SERPL-MCNC: 9.5 MG/DL (ref 8.6–10.4)
CHLORIDE SERPL-SCNC: 104 MMOL/L (ref 98–110)
CHOLEST SERPL-MCNC: 214 MG/DL
CHOLEST/HDLC SERPL: 2.6 (CALC)
CO2 SERPL-SCNC: 25 MMOL/L (ref 20–32)
CREAT SERPL-MCNC: 0.88 MG/DL (ref 0.5–1.05)
EGFRCR SERPLBLD CKD-EPI 2021: 72 ML/MIN/1.73M2
EOSINOPHIL # BLD AUTO: 121 CELLS/UL (ref 15–500)
EOSINOPHIL NFR BLD AUTO: 3.1 %
ERYTHROCYTE [DISTWIDTH] IN BLOOD BY AUTOMATED COUNT: 11.9 % (ref 11–15)
GLUCOSE SERPL-MCNC: 92 MG/DL (ref 65–99)
HCT VFR BLD AUTO: 39.7 % (ref 35–45)
HDLC SERPL-MCNC: 81 MG/DL
HGB BLD-MCNC: 13.3 G/DL (ref 11.7–15.5)
LDLC SERPL CALC-MCNC: 112 MG/DL (CALC)
LYMPHOCYTES # BLD AUTO: 1583 CELLS/UL (ref 850–3900)
LYMPHOCYTES NFR BLD AUTO: 40.6 %
MCH RBC QN AUTO: 30.8 PG (ref 27–33)
MCHC RBC AUTO-ENTMCNC: 33.5 G/DL (ref 32–36)
MCV RBC AUTO: 91.9 FL (ref 80–100)
MONOCYTES # BLD AUTO: 449 CELLS/UL (ref 200–950)
MONOCYTES NFR BLD AUTO: 11.5 %
NEUTROPHILS # BLD AUTO: 1708 CELLS/UL (ref 1500–7800)
NEUTROPHILS NFR BLD AUTO: 43.8 %
NONHDLC SERPL-MCNC: 133 MG/DL (CALC)
PLATELET # BLD AUTO: 207 THOUSAND/UL (ref 140–400)
PMV BLD REES-ECKER: 11.2 FL (ref 7.5–12.5)
POTASSIUM SERPL-SCNC: 4.7 MMOL/L (ref 3.5–5.3)
PROT SERPL-MCNC: 7.3 G/DL (ref 6.1–8.1)
RBC # BLD AUTO: 4.32 MILLION/UL (ref 3.8–5.1)
SODIUM SERPL-SCNC: 139 MMOL/L (ref 135–146)
TRIGL SERPL-MCNC: 104 MG/DL
TSH SERPL-ACNC: 3.46 MIU/L (ref 0.4–4.5)
WBC # BLD AUTO: 3.9 THOUSAND/UL (ref 3.8–10.8)

## 2025-04-18 ENCOUNTER — HOSPITAL ENCOUNTER (OUTPATIENT)
Dept: RADIOLOGY | Facility: HOSPITAL | Age: 70
Discharge: HOME | End: 2025-04-18
Payer: MEDICARE

## 2025-04-18 VITALS — BODY MASS INDEX: 28.25 KG/M2 | WEIGHT: 180 LBS | HEIGHT: 67 IN

## 2025-04-18 DIAGNOSIS — Z13.820 ENCOUNTER FOR SCREENING FOR OSTEOPOROSIS: ICD-10-CM

## 2025-04-18 DIAGNOSIS — Z78.0 MENOPAUSE: ICD-10-CM

## 2025-04-18 DIAGNOSIS — Z12.31 ENCOUNTER FOR SCREENING MAMMOGRAM FOR BREAST CANCER: ICD-10-CM

## 2025-04-18 PROCEDURE — 77080 DXA BONE DENSITY AXIAL: CPT

## 2025-04-18 PROCEDURE — 77067 SCR MAMMO BI INCL CAD: CPT

## 2025-05-12 ENCOUNTER — PATIENT MESSAGE (OUTPATIENT)
Dept: PRIMARY CARE | Facility: CLINIC | Age: 70
End: 2025-05-12
Payer: MEDICARE

## 2025-05-22 DIAGNOSIS — M54.30 SCIATICA, UNSPECIFIED LATERALITY: Primary | ICD-10-CM

## 2025-05-23 ENCOUNTER — TELEPHONE (OUTPATIENT)
Dept: PRIMARY CARE | Facility: CLINIC | Age: 70
End: 2025-05-23
Payer: MEDICARE

## 2025-05-23 DIAGNOSIS — M54.30 SCIATICA, UNSPECIFIED LATERALITY: Primary | ICD-10-CM

## 2025-05-23 RX ORDER — PREDNISONE 10 MG/1
TABLET ORAL
Qty: 30 TABLET | Refills: 0 | Status: SHIPPED | OUTPATIENT
Start: 2025-05-23 | End: 2025-06-02

## 2025-05-23 NOTE — TELEPHONE ENCOUNTER
Yuli pt.  Pt c/o 10/10 sciatica pain that flaired today.  States pain is from lower back down left leg, cannot even sit.  Had a flair 3 weeks ago that she suffered through and does not want to do again.      Pt only taking tylenol and asking if could Rx some type of pain med.  Pt wondering if somebody could go out to house today which is not possible, but would you recommend something like the [facility] nurse?    Pt sent [employer] message yesterday.  Please review before responding.    If Rx, [facility] [address] [facility]

## 2025-05-27 NOTE — PATIENT COMMUNICATION
Called pt to let her know what was in the message and she states that she doesn't think she can sit in a waiting room so she going to think on it

## 2025-06-02 DIAGNOSIS — M54.40 ACUTE BILATERAL LOW BACK PAIN WITH SCIATICA, SCIATICA LATERALITY UNSPECIFIED: Primary | ICD-10-CM

## 2025-06-03 ENCOUNTER — HOSPITAL ENCOUNTER (OUTPATIENT)
Dept: RADIOLOGY | Facility: HOSPITAL | Age: 70
Discharge: HOME | End: 2025-06-03
Payer: MEDICARE

## 2025-06-03 DIAGNOSIS — M54.40 ACUTE BILATERAL LOW BACK PAIN WITH SCIATICA, SCIATICA LATERALITY UNSPECIFIED: ICD-10-CM

## 2025-06-03 PROCEDURE — 72100 X-RAY EXAM L-S SPINE 2/3 VWS: CPT | Performed by: RADIOLOGY

## 2025-06-03 PROCEDURE — 72100 X-RAY EXAM L-S SPINE 2/3 VWS: CPT

## 2025-06-04 ENCOUNTER — APPOINTMENT (OUTPATIENT)
Dept: PHYSICAL THERAPY | Facility: CLINIC | Age: 70
End: 2025-06-04
Payer: MEDICARE

## 2025-06-04 DIAGNOSIS — M54.30 SCIATICA, UNSPECIFIED LATERALITY: ICD-10-CM

## 2025-06-04 PROCEDURE — 97162 PT EVAL MOD COMPLEX 30 MIN: CPT | Mod: GP | Performed by: PHYSICAL THERAPIST

## 2025-06-04 ASSESSMENT — ENCOUNTER SYMPTOMS
OCCASIONAL FEELINGS OF UNSTEADINESS: 0
LOSS OF SENSATION IN FEET: 0
DEPRESSION: 1

## 2025-06-04 NOTE — PROGRESS NOTES
Physical Therapy Evaluation    Patient Name: Elma Cox  MRN: [id number]  Evaluation Date: 6/4/2025  Time Calculation  Start Time: 1130  Stop Time: 1208  Time Calculation (min): 38 min    Current Problem  Problem List Items Addressed This Visit           ICD-10-CM    Sciatica M54.30       Subjective   General:       Patient reported hx of injury:  5/9/25 just started having pain L LB to L thigh, no PARIS, tingling in L knee, it was getting better but then on Memorial day came back around the time she was packing for a trip.  Patient had to cancel that [place] trip to see dtr.  Walks 4 miles a day, no not able to now.  Patient very fearful that this pain and the symptoms will return.  I do use a towel for lumbar support in my car.  Patient upset over her current pain situation but denies need for mental health resources.    Red Flags:     Denies  Precautions:   OP  Pain:   Mild tingle, L knee   Dull ant L hip 2 now  Up to a 10 at times  0 pain in left thigh currently    Home Living:     Pt gets around home safely      Work:  retired    Prior Function Per Pt/Caregiver Report:   Patient was able to travel, pack suitcase, sleep on sides, or stomach, and garden without pain or worry.  Pt goals: Return to prior level of function and improved pain  Imaging:  Recent x-rays but no written results of interpreting films yet    OBJECTIVE:  If left blank, assume NT:    Kay Lumbar Assessment:    S/S WORSE:  sitting, bending  S/S BETTER:  lye on back, walk, stand  POSTURE:  mild PPT  POSTURE CORRECTION:  NE on chair, A pain and tingle with posture correction on mat   Pt reports their typical sitting surface is a couch or chair.  Pt reports they are stiff getting up from chair:  no      Myotomes/MMT's R L   Hip Flexion 4/5 3+/5   Knee Extension 5/5 5/5   Ankle Dorsiflexion 5/5 5/5   EHL 5/5 4+/5   Ankle Plantarflexion               DTR       NE=No effect, C=centralize, A=abolish, p=peripheralize, P=produce, B=better, W=worse,  D=decrease, I=increase, NW=no worse, NB=no better    Lumbar spine/trunk ROM S/S   FIS Min/mod decr NE   EIS Min decr NE   SG L     SG R           S/S During S/S After   Rep FIS     Rep EIS D B   Rep EIL D B   Rep ISIDORO     Rep SG L     Rep SG R            Pt provisional classification posterior derangement, extension preference    Flexibility:       Flexibility R L   Hamstring     Quad     Hip flexor       Palpation:     Special Tests:   + L slump  Gait:   WFL  Balance:     Stairs:     Bed Mobility:   I  Transfers:   I  Other:       Outcome Measures:  OSWESTRY= incomplete    OP EDUCATION:  PT plan of care-patient agrees  Rehab diagnosis  HEP: Prone press ups 3-4 times every day x 10 reps 2-second holds  Toes together heels apart to decrease glutes facilitation  Stop if symptoms spike especially down the leg and thigh  Begin slowly working up walking distance if feels okay  And use towel for lumbar support at all times when seated    Today's Treatment:  No treatment billed today as pt requires prior authorization    Assessment       pt is s/p recent acute onset of left low back pain with radicular symptoms and needs PT to incr ROM, strength, improve posture and decr pain to restore function.      Based on the history including personal factors and/or comorbidities, examination of body systems including body structures and function, activity limitations, and/or participation restrictions, as well as clinical presentation, patient meets criteria for a moderate complexity evaluation.    Plan       Skilled PT consisting of:  Aquatics, therapeutic exercise, therapeutic activity, NMR, manual, thermal, electric stimulation, US, light therapy, gait training, transfer training, dry needle.  Mechanical traction PRN and only if OK by PT, canalith repositioning  Rehab Potential: good  Frequency:  2x/wk  Duration:  10 weeks  6/4/2025 to 9/1/2025 Medicare cert date:    Anticipate 10 PT sessions if authorized    Goals:    STG:   Pt  "to be I in initial HEP.  Pt to be I in posture correction.    LTG\"s:  Incr MMT of left lower extremity were limited by 1 grade for return to walking 4 miles.  Improve score on outcome form by 10 points to show incr in function.  Incr ROM of trunk flexion to WFL for ADL and picking up objects  Decr max pain level to 2 for restful sleep in any position.    Some of This note was created using voice recognition software and was not corrected for typographical or grammatical errors.   "

## 2025-06-05 NOTE — RESULT ENCOUNTER NOTE
Let the patient know via mychart- her x ray shows multi level degenerative changes mostly at level L5-S1 degenerative changes of the disc - can refer to physical therapy or if she would like pain management for  pain control per Kristen Rao, CNP

## 2025-06-09 ENCOUNTER — TREATMENT (OUTPATIENT)
Dept: PHYSICAL THERAPY | Facility: CLINIC | Age: 70
End: 2025-06-09
Payer: MEDICARE

## 2025-06-09 DIAGNOSIS — M54.30 SCIATICA, UNSPECIFIED LATERALITY: Primary | ICD-10-CM

## 2025-06-09 PROCEDURE — 97110 THERAPEUTIC EXERCISES: CPT | Mod: GP | Performed by: PHYSICAL THERAPIST

## 2025-06-09 NOTE — PROGRESS NOTES
Physical Therapy Treatment    Patient Name: Elma Cox  MRN: [id number]  Encounter date:  6/9/2025  Time Calculation  Start Time: 1558  Stop Time: 1640  Time Calculation (min): 42 min     PT Therapeutic Procedures Time Entry  Therapeutic Exercise Time Entry: 42    Visit Number:  2 (including evaluation)  Planned total visits: 6 per ins auth  Visit Authorized:  2025: EVAL ONLY - [facility] [employer] ADV - AUTH REQ / $25 COPAY / $3900 OOP not met / [place] VISITS / REF: [id number] / ds 6/2/25 // CALL FOR AUTH    Current Problem  Problem List Items Addressed This Visit           ICD-10-CM    Sciatica - Primary M54.30       Precautions   OP    Pain   3-4  Location L tailbone  Description dull  Subjective  Doing press ups 3-4x/day, using towel/pool noodle for L support in chair, may want self inflating lumbar pillow. Walking a mile most days, standing feel good.  Mild LBP 3/10 with press ups, 0 in tail bone, no pain when gets up in am.  Doing HEP 3-4x/day.  Tingling is better than 3 weeks ago, no L thigh pain not since last week.      General     Patient reported hx of injury:  5/9/25 just started having pain L LB to L thigh, no [place], tingling in L knee, it was getting better but then on Memorial day came back around the time she was packing for a trip.  Patient had to cancel that [place] trip to see dtr.  Walks 4 miles a day, no not able to now.  Patient very fearful that this pain and the symptoms will return.  I do use a towel for lumbar support in my car.  Patient upset over her current pain situation but denies need for mental health resources.          Objective:  L EHL 5/5, L hip,  flexion 4-/5    X-ray FINDINGS:  Grade 1 L4-5 anterolisthesis.  Grade 1 L1-2 retrolisthesis  Moderate discogenic and mild facet degenerative changes at L5-S1.  Mild facet degenerative changes at L3-4 and L4-5.  Mild discogenic degenerative changes at L1-2.  Calcifications in the left hemiabdomen which may represent  nephroliths.  "Vertebral body heights are preserved. Posterior elements  are intact.      IMPRESSION:  1. Multilevel degenerative changes, most pronounced at L5-S1.  2. Calcifications in the left hemiabdomen which may represent  nephroliths.    NE=No effect, C=centralize, A=abolish, p=peripheralize, P=produce, B=better, W=worse, D=decrease, I=increase, NW=no worse, NB=no better     Lumbar spine/trunk ROM S/S   FIS Min decr Mild pain way up   EIS Min decr Mild pain way back, with P tingle at waist   SG L       SG R                 S/S During S/S After   Rep FIS       Rep EIS     Rep EIL D A   Rep ISIDORO       Rep SG L       Rep SG R                 Treatment:     Worked on posture tech and ed., same for body mech.  Prone lye 2' x 2  EIL x 10-cues for incr ROM, less cues for toes together  Ed on tech with vacuum-use TrA  Trunk ROM as above in objective  Slouch correct 10 x 10\"      OP EDUCATION:   On x-ray dx, prevention of exacerbation    Assessment:     Pt's response to treatment:  good form with cues  Areas of improvements:  HEP and dx knowledge  Limitations/deficits:  ROM, pain, strength    Pain end of session:  0, no tingle either    Continued PT required to reach all goals and incr ROM strength decr pain    Plan     Skilled PT consisting of:  Aquatics, therapeutic exercise, therapeutic activity, NMR, manual, thermal, electric stimulation, US, light therapy, gait training, transfer training, dry needle.  Mechanical traction PRN and only if OK by PT, canalith repositioning  Rehab Potential: good  Frequency:  2x/wk  Duration:  10 weeks  6/4/2025 to 9/1/2025 Medicare cert date:    Anticipate 10 PT sessions if authorized     Goals:     STG:   Pt to be I in initial HEP.  Pt to be I in posture correction.     LTG\"s:  Incr MMT of left lower extremity were limited by 1 grade for return to walking 4 miles.  Improve score on outcome form by 10 points to show incr in function.  Incr ROM of trunk flexion to WFL for ADL and picking up " objects  Decr max pain level to 2 for restful sleep in any position.    Some of This note was created using voice recognition software and was not corrected for typographical or grammatical errors.

## 2025-06-11 ENCOUNTER — TREATMENT (OUTPATIENT)
Dept: PHYSICAL THERAPY | Facility: CLINIC | Age: 70
End: 2025-06-11
Payer: MEDICARE

## 2025-06-11 DIAGNOSIS — M54.30 SCIATICA, UNSPECIFIED LATERALITY: ICD-10-CM

## 2025-06-11 PROCEDURE — 97110 THERAPEUTIC EXERCISES: CPT | Mod: GP | Performed by: PHYSICAL THERAPIST

## 2025-06-11 NOTE — PROGRESS NOTES
Physical Therapy Treatment    Patient Name: Elma Cox  MRN: [id number]  Encounter date:  6/11/2025  Time Calculation  Start Time: 1001  Stop Time: 1030  Time Calculation (min): 29 min     PT Therapeutic Procedures Time Entry  Therapeutic Exercise Time Entry: 29    Visit Number:  3 (including evaluation)  Planned total visits: 6 per ins auth  Visit Authorized:  2025: EVAL ONLY - [facility] [employer] ADV - AUTH REQ / $25 COPAY / $3900 OOP not met / [place] VISITS / REF: [id number] / ds 6/2/25 // CALL FOR AUTH    Current Problem  Problem List Items Addressed This Visit           ICD-10-CM    Sciatica M54.30       Precautions   OP    Pain   0  Location L tailbone/low back/left lower extremity  Description dull  Subjective  Occasionally still gets tingling left knee, but all symptoms are much better.    Prior:  Doing press ups 3-4x/day, using towel/pool noodle for L support in chair, may want self inflating lumbar pillow. Walking a mile most days, standing feel good.  Mild LBP 3/10 with press ups, 0 in tail bone, no pain when gets up in am.  Doing HEP 3-4x/day.  Tingling is better than 3 weeks ago, no L thigh pain not since last week.      General     Patient reported hx of injury:  5/9/25 just started having pain L LB to L thigh, no [place], tingling in L knee, it was getting better but then on Memorial day came back around the time she was packing for a trip.  Patient had to cancel that [place] trip to see dtr.  Walks 4 miles a day, no not able to now.  Patient very fearful that this pain and the symptoms will return.  I do use a towel for lumbar support in my car.  Patient upset over her current pain situation but denies need for mental health resources.          Objective:  Lumbar extension in prone now full range of motion    Prior:  L EHL 5/5, L hip,  flexion 4-/5    X-ray FINDINGS:  Grade 1 L4-5 anterolisthesis.  Grade 1 L1-2 retrolisthesis  Moderate discogenic and mild facet degenerative changes at L5-S1.  Mild  "facet degenerative changes at L3-4 and L4-5.  Mild discogenic degenerative changes at L1-2.  Calcifications in the left hemiabdomen which may represent  nephroliths. Vertebral body heights are preserved. Posterior elements  are intact.      IMPRESSION:  1. Multilevel degenerative changes, most pronounced at L5-S1.  2. Calcifications in the left hemiabdomen which may represent  nephroliths.    Treatment:   EIL x 10-cues for incr ROM, less cues for toes together  Recumbent bike for 5 minutes with use of lumbar support  Prone lye 2' x 2  EIL x 10-cues for incr ROM, less cues for toes together and cues for added patient overpressure technique prior  Transverse abdominis instruction and performed 10 seconds x 10  Isohip adduction x 10 hip abduction in hook lying with green Thera-Band x 10    Prior:  Worked on posture tech and ed., same for body mech.  Ed on tech with vacuum-use TrA  Slouch correct 10 x 10\"      OP EDUCATION:  Continue extension and lying HEP begin to use patient overpressure by breathing out and letting abdomen sag at top for a few seconds  Continue to slowly progress walking  Add transverse abdominis 10 x 10 seconds once a day  Prior:   On x-ray dx, prevention of exacerbation    Assessment:     Pt's response to treatment: Less hesitation to new exercises, improving technique after cues  Areas of improvements:  HEP and dx knowledge.  Press ups/EIL continue to abolish tingling  Limitations/deficits:  ROM, pain, strength    Pain end of session:  0, no tingle either    Continued PT required to reach all goals and incr ROM strength decr pain    Plan     Skilled PT consisting of:  Aquatics, therapeutic exercise, therapeutic activity, NMR, manual, thermal, electric stimulation, US, light therapy, gait training, transfer training, dry needle.  Mechanical traction PRN and only if OK by PT, canalith repositioning  Rehab Potential: good  Frequency:  2x/wk  Duration:  10 weeks  6/4/2025 to 9/1/2025 Medicare cert " "date:    Anticipate 10 PT sessions if authorized     Goals:     STG:   Pt to be I in initial HEP.  Pt to be I in posture correction.     LTG\"s:  Incr MMT of left lower extremity were limited by 1 grade for return to walking 4 miles.  Improve score on outcome form by 10 points to show incr in function.  Incr ROM of trunk flexion to WFL for ADL and picking up objects  Decr max pain level to 2 for restful sleep in any position.    Some of This note was created using voice recognition software and was not corrected for typographical or grammatical errors.        "

## 2025-06-16 ENCOUNTER — TREATMENT (OUTPATIENT)
Dept: PHYSICAL THERAPY | Facility: CLINIC | Age: 70
End: 2025-06-16
Payer: MEDICARE

## 2025-06-16 DIAGNOSIS — M54.30 SCIATICA, UNSPECIFIED LATERALITY: ICD-10-CM

## 2025-06-16 PROBLEM — R06.00 DYSPNEA: Status: RESOLVED | Noted: 2023-09-09 | Resolved: 2025-06-16

## 2025-06-16 PROBLEM — R05.9 COUGH, UNSPECIFIED: Status: ACTIVE | Noted: 2025-06-16

## 2025-06-16 PROBLEM — L65.9 LOSS OF HAIR: Status: ACTIVE | Noted: 2025-06-16

## 2025-06-16 PROBLEM — J18.9 ATYPICAL PNEUMONIA: Status: RESOLVED | Noted: 2023-09-09 | Resolved: 2025-06-16

## 2025-06-16 PROBLEM — R79.89 DECREASED TESTOSTERONE LEVEL: Status: ACTIVE | Noted: 2023-05-17

## 2025-06-16 PROBLEM — D17.9 LIPOMA: Status: RESOLVED | Noted: 2023-09-09 | Resolved: 2025-06-16

## 2025-06-16 PROBLEM — E78.00 HYPERCHOLESTEROLEMIA: Status: RESOLVED | Noted: 2023-09-09 | Resolved: 2025-06-16

## 2025-06-16 PROBLEM — F32.A DEPRESSION: Status: RESOLVED | Noted: 2023-09-09 | Resolved: 2025-06-16

## 2025-06-16 PROCEDURE — 97140 MANUAL THERAPY 1/> REGIONS: CPT | Mod: GP | Performed by: PHYSICAL THERAPIST

## 2025-06-16 PROCEDURE — 97110 THERAPEUTIC EXERCISES: CPT | Mod: GP | Performed by: PHYSICAL THERAPIST

## 2025-06-16 NOTE — PROGRESS NOTES
[facility]: [facility]  TELEHEALTH ENCOUNTER      Elma Cox is a 69 y.o. female that is presenting today to discuss recent x ray results  This is a telehealth encounter with audio technology. Patient has consented to this type of visit. Duration of encounter: 7 minutes.  States she has been going to physical therapy which has helped.  Back pain is improving.  No recent sciatic flares    Assessment/Plan   Assessment & Plan  Spondylosis of lumbar region without myelopathy or radiculopathy    X-ray results 6/3/2025  FINDINGS:  Grade 1 L4-5 anterolisthesis.  Grade 1 L1-2 retrolisthesis  Moderate discogenic and mild facet degenerative changes at L5-S1.  Mild facet degenerative changes at L3-4 and L4-5.  Mild discogenic degenerative changes at L1-2.  Calcifications in the left hemiabdomen which may represent  nephroliths. Vertebral body heights are preserved. Posterior elements  are intact.     IMPRESSION:  1. Multilevel degenerative changes, most pronounced at L5-S1.  2. Calcifications in the left hemiabdomen which may represent  nephroliths.  Discussed x ray results in length with the patient.  Advised to continue physical therapy.  Walk daily  Start a daily oscal   Advised to call the office with any further questions or if she needs an extension of physical therapy         Subjective   Back Pain  This is a recurrent problem. The current episode started 1 to 4 weeks ago. The problem occurs constantly. The problem has been gradually improving since onset. The pain is present in the sacro-iliac. The quality of the pain is described as aching. The pain radiates to the left thigh. The pain is at a severity of 2/10. The pain is Worse during the day. The symptoms are aggravated by sitting. Stiffness is present In the morning. Risk factors include history of osteoporosis.     Review of Systems   Constitutional: Negative.    Musculoskeletal:  Positive for back pain.        See hpi for details     "  Objective   There were no vitals filed for this visit.  There is no height or weight on file to calculate BMI.  Physical Exam  Constitutional:       Appearance: Normal appearance.   Neurological:      Mental Status: She is alert.       Diagnostic Results   Lab Results   Component Value Date    GLUCOSE 92 04/16/2025    CALCIUM 9.5 04/16/2025     04/16/2025    K 4.7 04/16/2025    CO2 25 04/16/2025     04/16/2025    BUN 19 04/16/2025    CREATININE 0.88 04/16/2025     Lab Results   Component Value Date    ALT 18 04/16/2025    AST 18 04/16/2025    ALKPHOS 65 04/16/2025    BILITOT 0.9 04/16/2025     Lab Results   Component Value Date    WBC 3.9 04/16/2025    HGB 13.3 04/16/2025    HCT 39.7 04/16/2025    MCV 91.9 04/16/2025     04/16/2025     Lab Results   Component Value Date    CHOL 214 (H) 04/16/2025    CHOL 199 03/25/2024    CHOL 209 (H) 03/20/2023     Lab Results   Component Value Date    HDL 81 04/16/2025    HDL 68.0 03/25/2024    HDL 66 03/20/2023     Lab Results   Component Value Date    LDLCALC 112 (H) 04/16/2025    LDLCALC 116 03/25/2024    LDLCALC 122 03/20/2023     Lab Results   Component Value Date    TRIG 104 04/16/2025    TRIG 75 03/25/2024    TRIG 105 03/20/2023     No components found for: \"CHOLHDL\"  Lab Results   Component Value Date    HGBA1C 5.3 03/25/2024     Other labs not included in the list above were reviewed either before or during this encounter.    History   Medical History[1]  Surgical History[2]  Family History[3]  Social History     Socioeconomic History    Marital status:      Spouse name: Not on file    Number of children: Not on file    Years of education: Not on file    Highest education level: Not on file   Occupational History    Not on file   Tobacco Use    Smoking status: Never     Passive exposure: Never    Smokeless tobacco: Never   Substance and Sexual Activity    Alcohol use: Never    Drug use: Never    Sexual activity: Not on file   Other Topics " Concern    Not on file   Social History Narrative    Not on file     Social Drivers of Health     Financial Resource Strain: Not on file   Food Insecurity: Not on file   Transportation Needs: Not on file   Physical Activity: Not on file   Stress: Not on file   Social Connections: Not on file   Intimate Partner Violence: Not on file   Housing Stability: Not on file     Allergies[4]  Medications Ordered Prior to Encounter[5]  Immunization History   Administered Date(s) Administered    COVID-19, mRNA, LNP-S, PF, 30 mcg/0.3 mL dose 04/10/2021    Flu vaccine, quadrivalent, high-dose, preservative free, age 65y+ (FLUZONE) 10/20/2022    Influenza, Unspecified 10/15/2008    Influenza, injectable, quadrivalent 11/10/2015, 10/24/2018, 12/04/2019    Influenza, seasonal, injectable 08/24/2015    Pfizer Purple Cap SARS-CoV-2 05/01/2021    Pneumococcal conjugate vaccine, 13-valent (PREVNAR 13) 02/21/2022    Pneumococcal polysaccharide vaccine, 23-valent, age 2 years and older (PNEUMOVAX 23) 03/20/2023    Tdap vaccine, age 7 year and older (BOOSTRIX, ADACEL) 09/02/2008    Tetanus toxoid, adsorbed 08/01/2013     Patient's medical history was reviewed and updated either before or during this encounter.       Kristen Rao, APRN-CNP         [1]   Past Medical History:  Diagnosis Date    Atypical pneumonia 09/09/2023    Depression 09/09/2023    Dyspnea 09/09/2023    Hypercholesterolemia 09/09/2023    Lipoma 09/09/2023   [2] No past surgical history on file.  [3]   Family History  Problem Relation Name Age of Onset    Hypertension Mother      Heart disease Maternal Grandmother      Stroke Paternal Grandmother      Stroke Paternal Grandfather     [4]   Allergies  Allergen Reactions    Epinephrine Other     Rapid heartbeat    Metoprolol Succinate Other     Rapid heart rate. Only generic causes this. Pt is fine on toprol xl.   [5]   Current Outpatient Medications on File Prior to Visit   Medication Sig Dispense Refill    biotin 5,000 mcg  tablet,chewable Chew 1 capsule once daily.      L. acidophilus/Bifid. animalis (DAILY PROBIOTIC ORAL) as directed Orally      levothyroxine (Synthroid, Levoxyl) 50 mcg tablet Take 1 tablet (50 mcg) by mouth once daily in the morning. Take before meals. TAKE BEFORE MEALS 90 tablet 3    MAGNESIUM GLYCINATE ORAL Take by mouth. 1 TAB DAILY      multivitamin tablet Take 1 tablet by mouth once daily.      NON FORMULARY Ortho Thyroid -   take 2 capsules orally daily      propranolol (Inderal) 20 mg tablet Take 1 tablet (20 mg) by mouth once daily. 90 tablet 3    TURMERIC ORAL Take by mouth. WITH CURCUMIN  2 TABS (GUMMIES) DAILY      [DISCONTINUED] NON FORMULARY Bone Up ...   3 pill orally Daily       No current facility-administered medications on file prior to visit.

## 2025-06-16 NOTE — PROGRESS NOTES
Physical Therapy Treatment    Patient Name: Elma Cox  MRN: [id number]  Encounter date:  6/16/2025  Time Calculation  Start Time: 1300  Stop Time: 1338  Time Calculation (min): 38 min          Visit Number:  4 (including evaluation)  Planned total visits: 7 per ins auth  Visit Authorized:  2025: EVAL ONLY - [facility] [employer] ADV - AUTH REQ / $25 COPAY / $3900 OOP not met / [place]IS[place] / REF: [id number] / ds 6/2/25 // CALL FOR AUTH    Current Problem  Problem List Items Addressed This Visit    None        Precautions   OP    Pain   0  Location L tailbone/low back/left lower extremity  Description dull  Mild N L knee    Subjective  still gets tingling-calls it more numbness, left knee, but all symptoms are much better.  Still some L hip pain ant/post at times    Prior:  Doing press ups 3-4x/day, using towel/pool noodle for L support in chair, may want self inflating lumbar pillow. Walking a mile most days, standing feel good.  Mild LBP 3/10 with press ups, 0 in tail bone, no pain when gets up in am.  Doing HEP 3-4x/day.  Tingling is better than 3 weeks ago, no L thigh pain not since last week.      General     Patient reported hx of injury:  5/9/25 just started having pain L LB to L thigh, no [place], tingling in L knee, it was getting better but then on Memorial day came back around the time she was packing for a trip.  Patient had to cancel that [place] trip to see dtr.  Walks 4 miles a day, no not able to now.  Patient very fearful that this pain and the symptoms will return.  I do use a towel for lumbar support in my car.  Patient upset over her current pain situation but denies need for mental health resources.          Objective:  FIS mild waist pain on the way up-same as -after PA mobs  EIS mild waist pulling on the way up-A after PA mobs  FIS NE after PT with sacral pressure with press up  EIS NE after PT with sacral pressure with press up    Lumbar extension in prone full range of motion    Prior:  L EHL  "5/5, L hip,  flexion 4-/5    X-ray FINDINGS:  Grade 1 L4-5 anterolisthesis.  Grade 1 L1-2 retrolisthesis  Moderate discogenic and mild facet degenerative changes at L5-S1.  Mild facet degenerative changes at L3-4 and L4-5.  Mild discogenic degenerative changes at L1-2.  Calcifications in the left hemiabdomen which may represent  nephroliths. Vertebral body heights are preserved. Posterior elements  are intact.      IMPRESSION:  1. Multilevel degenerative changes, most pronounced at L5-S1.  2. Calcifications in the left hemiabdomen which may represent  nephroliths.    Treatment:    Recumbent bike for 5 minutes with use of lumbar support DNP  Prone lye 2' x 2  EIL x 10 with overpressure on sacrum-cues for incr ROM, less cues for toes together and cues for added patient overpressure technique prior  Transverse abdominis instruction and performed 10 seconds x 10 DNP    Seated with TrA:  Isohip adduction x 10 hip abduction in hook lying with green Thera-Band x 10  Manual:  PA mobs L spine to reduce derangement    Prior:  Worked on posture tech and ed., same for body mech.  Ed on tech with vacuum-use TrA  Slouch correct 10 x 10\"      OP EDUCATION:  Ergonomic education     Continue extension and lying HEP begin to use patient overpressure by breathing out and letting abdomen sag at top for a few seconds  Continue to slowly progress walking  Add transverse abdominis 10 x 10 seconds once a day  Prior:   On x-ray dx, prevention of exacerbation    Assessment:     Pt's response to treatment: Less hesitation to new exercises, improving technique after cues  Areas of improvements:  HEP and dx knowledge.  Press ups/EIL continue to abolish paresthesia-better results with clinician over pressure than with mobs  Limitations/deficits:  ROM, pain, strength    Pain end of session:  0, no tingle either    Continued PT required to reach all goals and incr ROM strength decr pain    Plan   test L hip flexion MMT, hip testing as " "needed.    Skilled PT consisting of:  Aquatics, therapeutic exercise, therapeutic activity, NMR, manual, thermal, electric stimulation, US, light therapy, gait training, transfer training, dry needle.  Mechanical traction PRN and only if OK by PT, canalith repositioning  Rehab Potential: good  Frequency:  2x/wk  Duration:  10 weeks  6/4/2025 to 9/1/2025 Medicare cert date:    Anticipate 10 PT sessions if authorized     Goals:     STG:   Pt to be I in initial HEP.  Pt to be I in posture correction.     LTG\"s:  Incr MMT of left lower extremity were limited by 1 grade for return to walking 4 miles.  Improve score on outcome form by 10 points to show incr in function.  Incr ROM of trunk flexion to WFL for ADL and picking up objects  Decr max pain level to 2 for restful sleep in any position.    Some of This note was created using voice recognition software and was not corrected for typographical or grammatical errors.        "

## 2025-06-17 ENCOUNTER — TELEMEDICINE (OUTPATIENT)
Dept: PRIMARY CARE | Facility: CLINIC | Age: 70
End: 2025-06-17
Payer: MEDICARE

## 2025-06-17 DIAGNOSIS — M47.816 SPONDYLOSIS OF LUMBAR REGION WITHOUT MYELOPATHY OR RADICULOPATHY: Primary | ICD-10-CM

## 2025-06-17 PROCEDURE — 1159F MED LIST DOCD IN RCRD: CPT | Performed by: NURSE PRACTITIONER

## 2025-06-17 PROCEDURE — 99213 OFFICE O/P EST LOW 20 MIN: CPT | Performed by: NURSE PRACTITIONER

## 2025-06-17 PROCEDURE — 1036F TOBACCO NON-USER: CPT | Performed by: NURSE PRACTITIONER

## 2025-06-17 ASSESSMENT — PATIENT HEALTH QUESTIONNAIRE - PHQ9
2. FEELING DOWN, DEPRESSED OR HOPELESS: NOT AT ALL
SUM OF ALL RESPONSES TO PHQ9 QUESTIONS 1 AND 2: 0
1. LITTLE INTEREST OR PLEASURE IN DOING THINGS: NOT AT ALL

## 2025-06-17 ASSESSMENT — ENCOUNTER SYMPTOMS
CONSTITUTIONAL NEGATIVE: 1
BACK PAIN: 1

## 2025-06-18 ENCOUNTER — TREATMENT (OUTPATIENT)
Dept: PHYSICAL THERAPY | Facility: CLINIC | Age: 70
End: 2025-06-18
Payer: MEDICARE

## 2025-06-18 DIAGNOSIS — M54.30 SCIATICA, UNSPECIFIED LATERALITY: ICD-10-CM

## 2025-06-18 PROCEDURE — 97110 THERAPEUTIC EXERCISES: CPT | Mod: GP | Performed by: PHYSICAL THERAPIST

## 2025-06-18 NOTE — PROGRESS NOTES
Physical Therapy Treatment    Patient Name: Elma Cox  MRN: [id number]  Encounter date:  6/18/2025  Time Calculation  Start Time: 1043  Stop Time: 1115  Time Calculation (min): 32 min     PT Therapeutic Procedures Time Entry  Therapeutic Exercise Time Entry: 32    Visit Number:  5 (including evaluation)  Planned total visits: 7 per ins auth  Visit Authorized:  2025: EVAL ONLY - [facility] [employer] ADV - AUTH REQ / $25 COPAY / $3900 OOP not met / [place] / [place] [facility] [id number] / [place] 6/2/25 // CALL FOR AUTH  re-eval #7    Current Problem  Problem List Items Addressed This Visit           ICD-10-CM    Sciatica M54.30         Precautions   OP    Pain   0  Location L tailbone/low back/left lower extremity  Description dull  Mild N L knee    Subjective  No more N/T or LBP.  Friends are not comfortable putting pressure on sacrum with EIL    Prior:  Doing press ups 3-4x/day, using towel/pool noodle for L support in chair, may want self inflating lumbar pillow. Walking a mile most days, standing feel good.  Mild LBP 3/10 with press ups, 0 in tail bone, no pain when gets up in am.  Doing HEP 3-4x/day.  Tingling is better than 3 weeks ago, no L thigh pain not since last week.      General     Patient reported hx of injury:  5/9/25 just started having pain L LB to L thigh, no [place], tingling in L knee, it was getting better but then on Memorial day came back around the time she was packing for a trip.  Patient had to cancel that [place] trip to see dtr.  Walks 4 miles a day, no not able to now.  Patient very fearful that this pain and the symptoms will return.  I do use a towel for lumbar support in my car.  Patient upset over her current pain situation but denies need for mental health resources.          Objective:  Able to do 1/3 ROM bridge    prior  FIS mild waist pain on the way up-same as -after PA mobs  EIS mild waist pulling on the way up-A after PA mobs  FIS NE after PT with sacral pressure with press up  EIS NE after  "PT with sacral pressure with press up    Lumbar extension in prone full range of motion    Prior:  L EHL 5/5, L hip,  flexion 4-/5    X-ray FINDINGS:  Grade 1 L4-5 anterolisthesis.  Grade 1 L1-2 retrolisthesis  Moderate discogenic and mild facet degenerative changes at L5-S1.  Mild facet degenerative changes at L3-4 and L4-5.  Mild discogenic degenerative changes at L1-2.  Calcifications in the left hemiabdomen which may represent  nephroliths. Vertebral body heights are preserved. Posterior elements  are intact.      IMPRESSION:  1. Multilevel degenerative changes, most pronounced at L5-S1.  2. Calcifications in the left hemiabdomen which may represent  nephroliths.    Treatment:     test L hip flexion MMT next session    Recumbent bike for 6 minutes lv 3  Prone lye 2' x 2  EIL 2 x 10 with pt overpressure and clinician overpressure on sacrum  Transverse abdominis instruction and performed 10 seconds x 10     Seated with TrA:  Isohip adduction 2 x 10 hip abduction in hook lying with green Thera-Band 2 x 10  Slouch correct 10 x 10\" DNP  Hs stretch x 30\" ea    Supine:  Bridge 1/3 ROM x 10    Manual:ELZBIETA LEAL mobs L spine to reduce derangement    Reinforced:  Worked on posture tech and ed., same for body mech.  Ed on tech with vacuum-use TrA        OP EDUCATION:  Incr walks to 2.5 miles as tolerated  Do EIL under bed if able to generate overpressure this way    Prior:  Ergonomic education     Continue extension and lying HEP begin to use patient overpressure by breathing out and letting abdomen sag at top for a few seconds  Continue to slowly progress walking  Add transverse abdominis 10 x 10 seconds once a day  Prior:   On x-ray dx, prevention of exacerbation    Assessment:     Pt's response to treatment: Less hesitation to new exercises, improving technique after cues  Areas of improvements:  HEP and dx knowledge.  Press ups/EIL continue to abolish paresthesia-better results with clinician over pressure than with " "mobs  Limitations/deficits:  ROM, pain, strength    Pain end of session:  0, no n/tingle either    Continued PT required to reach all goals and incr ROM strength decr pain    Plan   test L hip flexion MMT    Skilled PT consisting of:  Aquatics, therapeutic exercise, therapeutic activity, NMR, manual, thermal, electric stimulation, US, light therapy, gait training, transfer training, dry needle.  Mechanical traction PRN and only if OK by PT, canalith repositioning  Rehab Potential: good  Frequency:  2x/wk  Duration:  10 weeks  6/4/2025 to 9/1/2025 Medicare cert date:    Anticipate 10 PT sessions if authorized     Goals:     STG:   Pt to be I in initial HEP.  Pt to be I in posture correction.     LTG\"s:  Incr MMT of left lower extremity were limited by 1 grade for return to walking 4 miles.  Improve score on outcome form by 10 points to show incr in function.  Incr ROM of trunk flexion to WFL for ADL and picking up objects  Decr max pain level to 2 for restful sleep in any position.    Some of This note was created using voice recognition software and was not corrected for typographical or grammatical errors.        "

## 2025-06-23 ENCOUNTER — TREATMENT (OUTPATIENT)
Dept: PHYSICAL THERAPY | Facility: CLINIC | Age: 70
End: 2025-06-23
Payer: MEDICARE

## 2025-06-23 DIAGNOSIS — M54.30 SCIATICA, UNSPECIFIED LATERALITY: Primary | ICD-10-CM

## 2025-06-23 PROCEDURE — 97110 THERAPEUTIC EXERCISES: CPT | Mod: GP | Performed by: PHYSICAL THERAPIST

## 2025-06-23 NOTE — PROGRESS NOTES
Physical Therapy Treatment    Patient Name: Elma Cox  MRN: [id number]  Encounter date:  6/23/2025  Time Calculation  Start Time: 1045  Stop Time: 1124  Time Calculation (min): 39 min     PT Therapeutic Procedures Time Entry  Therapeutic Exercise Time Entry: 39    Visit Number:  6 (including evaluation)  Planned total visits: 7 per ins auth  Visit Authorized:  2025: EVAL ONLY - [facility] [employer] ADV - AUTH REQ / $25 COPAY / $3900 OOP not met / [place]ISITS / REF: [id number] / ds 6/2/25 // CALL FOR AUTH  re-eval #7    Current Problem  Problem List Items Addressed This Visit           ICD-10-CM    Sciatica - Primary M54.30           Precautions   OP    Pain   0  Location L tailbone/low back/left lower extremity  Description dull  Mild N L knee    Subjective  Only had back/hip pain last Saturday when she was sitting for a long time.  Otherwise, no pain or paresthesia.    Prior:  Doing press ups 3-4x/day, using towel/pool noodle for L support in chair, may want self inflating lumbar pillow. Walking a mile most days, standing feel good.  Mild LBP 3/10 with press ups, 0 in tail bone, no pain when gets up in am.  Doing HEP 3-4x/day.  Tingling is better than 3 weeks ago, no L thigh pain not since last week.      General     Patient reported hx of injury:  5/9/25 just started having pain L LB to L thigh, no [place], tingling in L knee, it was getting better but then on Memorial day came back around the time she was packing for a trip.  Patient had to cancel that [place] trip to see dtr.  Walks 4 miles a day, no not able to now.  Patient very fearful that this pain and the symptoms will return.  I do use a towel for lumbar support in my car.  Patient upset over her current pain situation but denies need for mental health resources.          Objective:  L hip flexion MMT 4-, R 4    Prior:  Able to do 1/3 ROM bridge    prior  FIS mild waist pain on the way up-same as -after PA mobs  EIS mild waist pulling on the way up-A  "after MEL uriarte  FIS NE after PT with sacral pressure with press up  EIS NE after PT with sacral pressure with press up    Lumbar extension in prone full range of motion    Prior:  L EHL 5/5, L hip,  flexion 4-/5    X-ray FINDINGS:  Grade 1 L4-5 anterolisthesis.  Grade 1 L1-2 retrolisthesis  Moderate discogenic and mild facet degenerative changes at L5-S1.  Mild facet degenerative changes at L3-4 and L4-5.  Mild discogenic degenerative changes at L1-2.  Calcifications in the left hemiabdomen which may represent  nephroliths. Vertebral body heights are preserved. Posterior elements  are intact.      IMPRESSION:  1. Multilevel degenerative changes, most pronounced at L5-S1.  2. Calcifications in the left hemiabdomen which may represent  nephroliths.    Treatment:      Squats with transverse abdominis x 10 holding sink cues for proper form to avoid extra knee strain  Recumbent bike for 8 minutes lv 3  Prone lye 2'   SLR x 10 ea  EIL  x 10 with pt overpressure  Extension in standing 2 x 10 first set with hands uses brace, second set backed up to high countertop.    Seated with TrA:  Isohip adduction 2 x 10 hip abduction in hook lying with green Thera-Band 2 x 10 DNP  Slouch correct 10 x 10\"   Hs stretch x 30\" ea DNP    Supine: ELZBIETA  Bridge 1/3 ROM x 10    Manual:DNP  PA mobs L spine to reduce derangement    Reinforced: ELZBIETA  Worked on posture tech and ed., same for body mech.  Ed on tech with vacuum-use TrA        OP EDUCATION:  Continue to increase overall activity, exercise press ups before and after, or extension in standing as long as symptoms favor this as well.    Incr walks to 2.5 miles as tolerated  Do EIL under bed if able to generate overpressure this way-pt bed height unable     Prior:  Ergonomic education   Hip abd and add, slouch correct HEP  Continue extension and lying HEP begin to use patient overpressure by breathing out and letting abdomen sag at top for a few seconds  Continue to slowly progress " "walking  Add transverse abdominis 10 x 10 seconds once a day  Prior:   On x-ray dx, prevention of exacerbation    Assessment:     Pt's response to treatment: fair carryover with some instruction-will need written down  Areas of improvements: pt now able to do full ROM EIS pain free.  HEP and dx knowledge.  Press ups/EIL continue to abolish paresthesia-better results with clinician over pressure than with mobs  Limitations/deficits:  ROM, pain, strength    Pain end of session:  0, no n/tingle either    Continued PT required to reach all goals and incr ROM strength decr pain    Plan   re-eval.  Issue more HEP-bridge, TrA, PPT, SLR, handouts for if pain returns.    Skilled PT consisting of:  Aquatics, therapeutic exercise, therapeutic activity, NMR, manual, thermal, electric stimulation, US, light therapy, gait training, transfer training, dry needle.  Mechanical traction PRN and only if OK by PT, canalith repositioning  Rehab Potential: good  Frequency:  2x/wk  Duration:  10 weeks  6/4/2025 to 9/1/2025 Medicare cert date:    Anticipate 10 PT sessions if authorized     Goals:     STG:   Pt to be I in initial HEP.  Pt to be I in posture correction.     LTG\"s:  Incr MMT of left lower extremity were limited by 1 grade for return to walking 4 miles.  Improve score on outcome form by 10 points to show incr in function.  Incr ROM of trunk flexion to WFL for ADL and picking up objects  Decr max pain level to 2 for restful sleep in any position.    Some of This note was created using voice recognition software and was not corrected for typographical or grammatical errors.        "

## 2025-06-25 ENCOUNTER — TREATMENT (OUTPATIENT)
Dept: PHYSICAL THERAPY | Facility: CLINIC | Age: 70
End: 2025-06-25
Payer: MEDICARE

## 2025-06-25 DIAGNOSIS — M54.30 SCIATICA, UNSPECIFIED LATERALITY: ICD-10-CM

## 2025-06-25 PROCEDURE — 97110 THERAPEUTIC EXERCISES: CPT | Mod: GP | Performed by: PHYSICAL THERAPIST

## 2025-06-25 NOTE — PROGRESS NOTES
"Physical Therapy Treatment and Progress Report    Patient Name: Elma Cox  MRN: [id number]  Encounter date:  6/25/2025  Time Calculation  Start Time: 1047  Stop Time: 1117  Time Calculation (min): 30 min     PT Therapeutic Procedures Time Entry  Therapeutic Exercise Time Entry: 30    Visit Number:  7 (including evaluation)  Planned total visits: 7 per ins auth  Visit Authorized:  2025: EVAL ONLY - [facility] [employer] ADV - AUTH REQ / $25 COPAY / $3900 OOP not met / [place] VISITS / REF: [id number] / ds 6/2/25 // CALL FOR AUTH      Current Problem  Problem List Items Addressed This Visit           ICD-10-CM    Sciatica M54.30       Precautions   OP    Pain   0  Location L tailbone/low back/left lower extremity      Subjective  Walks up to 2 miles, no pain or paresthesia, but still limiting function and bending, gardening and walking distance due to fear of recurrence of pain and paresthesia. No problem sleeping, but has not tried to do sleep on sides yet.       OBJECTIVE:  If left blank, assume NT:         Myotomes/MMT's R L   Hip Flexion 4+/5 4/5   Knee Extension 5/5 5/5   Ankle Dorsiflexion 5/5 5/5   EHL 5/5 5/5   Ankle Plantarflexion                       DTR          NE=No effect, C=centralize, A=abolish, p=peripheralize, P=produce, B=better, W=worse, D=decrease, I=increase, NW=no worse, NB=no better     ROM:    Lumbar spine/trunk ROM S/S   FIS wfl NE   EIS Min decr NE       Outcome Measures:  OSWESTRY= incomplete    Treatment:      Squats with transverse abdominis 2 x 10   Prone lye 2'   EIL  x 10 with pt overpressure  Extension in standing 2 x 10 first set with hands uses brace, second set backed up to high countertop.  Slouch correct 10 x 10\"       OP EDUCATION:    Continue HEP:  Access Code: [email or web address]  URL: [email or web address]  Date: 06/25/2025  Prepared by: Jose Tucker    Exercises  - Supine Transversus Abdominis Bracing - Hands on Stomach  - 1 x daily - 7 x weekly - 3 sets - 10 reps  - " "Supine Bridge  - 1 x daily - 7 x weekly - 3 sets - 10 reps  - Supine Posterior Pelvic Tilt  - 1 x daily - 7 x weekly - 3 sets - 10 reps  - Sidelying Pelvic Floor Contraction with Hip Abduction  - 1 x daily - 7 x weekly - 3 sets - 10 reps  - Active Straight Leg Raise with Quad Set  - 1 x daily - 7 x weekly - 3 sets - 10 reps  - Hooklying Isometric Clamshell  - 1 x daily - 7 x weekly - 3 sets - 10 reps  - Supine Hip Adduction Isometric with Ball  - 1 x daily - 7 x weekly - 3 sets - 10 reps  - Standing Lumbar Extension with Counter  - 1 x daily - 7 x weekly - 1 sets - 10 reps - 2 seconds hold  - Standing Back Extension  - 1 x daily - 7 x weekly - 1 sets - 10 reps - 2 seconds hold   If pain returns stop all te's but for EIL-and do EIL 5x/day.  If does not help call MD and PT    Assessment:     Pt is progressing toward goals, but requires continued PT to reach all goals and restore function.  PT recommendation is to continue to reach all goals.  Pt agrees.        Pain end of session:  0, no n/tingle either    Continued PT required to reach all goals and incr ROM strength decr pain    Plan:  Asking for 4 more sessions to reach all goals, but pt may just do HEP and only come back if her s/s return, or if she cannot tolerate sleeping on side or walking her 4 miles as per PLOF.    Skilled PT consisting of:   therapeutic exercise, therapeutic activity, NMR, manual, thermal, electric stimulation, US, light therapy, mechanical traction as needed and only if okay by PT.  Rehab Potential: good  Frequency:  2x/wk  Duration:  10 weeks  6/4/2025 to 9/1/2025 Medicare cert date      Anticipate up to 4 more PT sessions if authorized     Goals:     STG:   Pt to be I in initial HEP. Goal met  Pt to be I in posture correction. goal met     LTG\"s:  Incr MMT of left lower extremity were limited by 1 grade for return to walking 4 miles.  progressing  Improve score on outcome form by 10 points to show incr in function.-discontinue goal-form " incomplete  Incr ROM of trunk flexion to WFL for ADL and picking up objects- goal met  Decr max pain level to 2 for restful sleep in any position. -progressing    Some of This note was created using voice recognition software and was not corrected for typographical or grammatical errors.